# Patient Record
Sex: MALE | Race: WHITE
[De-identification: names, ages, dates, MRNs, and addresses within clinical notes are randomized per-mention and may not be internally consistent; named-entity substitution may affect disease eponyms.]

---

## 2020-10-06 ENCOUNTER — HOSPITAL ENCOUNTER (EMERGENCY)
Dept: HOSPITAL 88 - ER | Age: 55
Discharge: HOME | End: 2020-10-06
Payer: COMMERCIAL

## 2020-10-06 VITALS — HEIGHT: 72 IN | WEIGHT: 180 LBS | BODY MASS INDEX: 24.38 KG/M2

## 2020-10-06 VITALS — SYSTOLIC BLOOD PRESSURE: 121 MMHG | DIASTOLIC BLOOD PRESSURE: 93 MMHG

## 2020-10-06 DIAGNOSIS — F17.210: ICD-10-CM

## 2020-10-06 DIAGNOSIS — R51.9: Primary | ICD-10-CM

## 2020-10-06 LAB
ALBUMIN SERPL-MCNC: 4.3 G/DL (ref 3.5–5)
ALBUMIN/GLOB SERPL: 1.4 {RATIO} (ref 0.8–2)
ALP SERPL-CCNC: 78 IU/L (ref 40–150)
ALT SERPL-CCNC: 17 IU/L (ref 0–55)
ANION GAP SERPL CALC-SCNC: 13.8 MMOL/L (ref 8–16)
BASOPHILS # BLD AUTO: 0.1 10*3/UL (ref 0–0.1)
BASOPHILS NFR BLD AUTO: 0.7 % (ref 0–1)
BUN SERPL-MCNC: 12 MG/DL (ref 7–26)
BUN/CREAT SERPL: 10 (ref 6–25)
CALCIUM SERPL-MCNC: 9.6 MG/DL (ref 8.4–10.2)
CHLORIDE SERPL-SCNC: 103 MMOL/L (ref 98–107)
CK MB SERPL-MCNC: 2.4 NG/ML (ref 0–5)
CK SERPL-CCNC: 162 IU/L (ref 30–200)
CO2 SERPL-SCNC: 27 MMOL/L (ref 22–29)
DEPRECATED NEUTROPHILS # BLD AUTO: 8.2 10*3/UL (ref 2.1–6.9)
EGFRCR SERPLBLD CKD-EPI 2021: 60 ML/MIN (ref 60–?)
EOSINOPHIL # BLD AUTO: 0.3 10*3/UL (ref 0–0.4)
EOSINOPHIL NFR BLD AUTO: 2.4 % (ref 0–6)
ERYTHROCYTE [DISTWIDTH] IN CORD BLOOD: 12.9 % (ref 11.7–14.4)
GLOBULIN PLAS-MCNC: 3 G/DL (ref 2.3–3.5)
GLUCOSE SERPLBLD-MCNC: 90 MG/DL (ref 74–118)
HCT VFR BLD AUTO: 46.2 % (ref 38.2–49.6)
HGB BLD-MCNC: 15.5 G/DL (ref 14–18)
LYMPHOCYTES # BLD: 2 10*3/UL (ref 1–3.2)
LYMPHOCYTES NFR BLD AUTO: 17.2 % (ref 18–39.1)
MCH RBC QN AUTO: 31.6 PG (ref 28–32)
MCHC RBC AUTO-ENTMCNC: 33.5 G/DL (ref 31–35)
MCV RBC AUTO: 94.3 FL (ref 81–99)
MONOCYTES # BLD AUTO: 0.8 10*3/UL (ref 0.2–0.8)
MONOCYTES NFR BLD AUTO: 7.3 % (ref 4.4–11.3)
NEUTS SEG NFR BLD AUTO: 71.9 % (ref 38.7–80)
PLATELET # BLD AUTO: 197 X10E3/UL (ref 140–360)
POTASSIUM SERPL-SCNC: 3.8 MMOL/L (ref 3.5–5.1)
RBC # BLD AUTO: 4.9 X10E6/UL (ref 4.3–5.7)
SODIUM SERPL-SCNC: 140 MMOL/L (ref 136–145)

## 2020-10-06 PROCEDURE — 99284 EMERGENCY DEPT VISIT MOD MDM: CPT

## 2020-10-06 PROCEDURE — 85025 COMPLETE CBC W/AUTO DIFF WBC: CPT

## 2020-10-06 PROCEDURE — 84484 ASSAY OF TROPONIN QUANT: CPT

## 2020-10-06 PROCEDURE — 82550 ASSAY OF CK (CPK): CPT

## 2020-10-06 PROCEDURE — 82553 CREATINE MB FRACTION: CPT

## 2020-10-06 PROCEDURE — 36415 COLL VENOUS BLD VENIPUNCTURE: CPT

## 2020-10-06 PROCEDURE — 93005 ELECTROCARDIOGRAM TRACING: CPT

## 2020-10-06 PROCEDURE — 80053 COMPREHEN METABOLIC PANEL: CPT

## 2020-10-06 PROCEDURE — 70450 CT HEAD/BRAIN W/O DYE: CPT

## 2020-10-06 NOTE — EMERGENCY DEPARTMENT NOTE
History of Present Illnes


History of Present Illness


History of Present Illness


This is a 55 year old  male presents to the ED for HA R temple 20 min 

pta which has eased up since arrival to ED. Complained of self limited R 

perioral numbness . .


Historian:  Patient


Arrival Mode:  Car


Onset (how long ago):  minute(s) (20)


Location:  R temple


Radiation:  Reports non-radiation


Severity:  mild


Onset quality:  sudden


Timing of current episode:  constant


Progression:  partially resolved


Chronicity:  new


Relieving factors:  none


Exacerbating factors:  none


Associated symptoms:  Denies denies other symptoms, Denies confusion, Denies 

chest pain, Denies cough, Denies diaphoresis, Denies fever/chills, Denies 

headaches, Denies loss of appetite, Denies malaise, Denies nausea/vomiting, 

Denies rash, Denies seizure, Denies shortness of breath, Denies syncope, Denies 

weakness, Denies other


Treatments prior to arrival:  none


 (VANDA ZENG DO)





Past Medical/Family History


Physician Review


I have reviewed the patient's past medical and family history.  Any updates have

been documented here.


 (VANDA ZENG DO)





Past Medical History


Recent Fever:  No


Clinical Suspicion of Infectio:  No


Past Medical History:  None


Past Surgical History:  T&A


 (VANDA ZENG DO)





Social History


Smoking Cessation:  Current some day smoker


Counseling Performed:  Yes


Alcohol Use:  None


Any Illegal Drug Use:  No


 (VANDA ZENG DO)





Other


Last Tetanus:  UNKNOWN


 (VANDA ZENG DO)





Review of Systems


Review of Systems


Constitutional:  Reports no symptoms


EENTM:  Reports no symptoms


Cardiovascular:  Reports no symptoms


Respiratory:  Reports no symptoms


Gastrointestinal:  Reports no symptoms


Genitourinary:  Reports no symptoms


Musculoskeletal:  Reports no symptoms


Integumentary:  Reports no symptoms


Neurological:  Reports headache


Psychological:  Reports no symptoms


Endocrine:  Reports no symptoms


Hematological/Lymphatic:  Reports no symptoms (VANDA ZENG DO)





Physical Exam


Related Data


Allergies:  


Coded Allergies:  


     No Known Allergies (Unverified , 3/29/18)


Vital signs reviewed:  Yes


 (VANDA ZENG DO)





Physical Exam


CONSTITUTIONAL





Constitutional:  Present well-developed, Present well-nourished


HENT


HENT:  Present normocephalic, Present atraumatic, Present oropharynx 

clear/moist, Present nose normal


HENT L/R:  Present left ext ear normal, Present right ext ear normal


EYES





Eyes:  Reports PERRL, Reports conjunctivae normal


NECK


Neck:  Present ROM normal


PULMONARY


Pulmonary:  Present effort normal, Present breath sounds normal


CARDIOVASCULAR





Cardiovascular:  Present regular rhythm, Present heart sounds normal, Present 

capillary refill normal, Present normal rate


GASTROINTESTINAL





Abdominal:  Present soft, Present nontender, Present bowel sounds normal


GENITOURINARY





Genitourinary:  Present exam deferred


SKIN


Skin:  Present warm, Present dry


MUSCULOSKELETAL





Musculoskeletal:  Present ROM normal


NEUROLOGICAL





Neurological:  Present alert, Present oriented x 3, Present no gross motor or 

sensory deficits


PSYCHOLOGICAL


Psychological:  Present mood/affect normal, Present judgement normal 

(VANDA ZENG DO)





Assessment & Plan


Medical Decision Making


MDM


diff dx : SAH, migraine, bells palsy, TIA, CVA


 (VANDA ZENG DO)


MDM


Ct head benign, labs benign. Symptoms are largely resolved. Doubt TIA or CVA. 

Doubt emergent process at this time. I discussed results patient as well as 

expected disease time course and management. They will follow up with their 

primary care provider or return to the emergency department for new or worsening

symptoms. Patient's appropriate for discharge.





Part of this note was dictated with Johanna and is subject to recognition errors.


 (DARNELL ROBBINS MD)





Reassessment


Reassessment time:  06:41


Reassessment


Well appearing, NAD


 (DARNELL ROBBINS MD)





Assessment & Plan


Final Impression:  


(1) Headache (DARNELL ROBBINS MD)


Depart Disposition:  HOME, SELF-CARE











VANDA ZENG DO                 Oct 6, 2020 05:38


DARNELL ROBBINS MD           Oct 6, 2020 06:49

## 2020-10-06 NOTE — XMS REPORT
Continuity of Care Document

                             Created on: 10/06/2020



EMY BANG

External Reference #: [id number]

: 1965

Sex: Male



Demographics





                          Address                   [address] [place] [address]  [address]

 

                          Home Phone                [phone]

 

                          Preferred Language        Unknown

 

                          Marital Status            Unknown

 

                          Mor[employer] Affiliation     Unknown

 

                          Race                      Unknown

 

                          Additional Race(s)        White



 

                          Ethnic Group              Unknown





Author





                          Author                    [facility]

t

 

                          Organization              [facility]

 

                          Address                   [address] [place] [address] [address]

[place]  [address]



 

                          Phone                     Unavailable







Care Team Providers





                    Care Team Member Name Role                Phone

 

                    SERENA MUÑOZ MD   PCP                 [phone]

 

                    WEST KERNS        Attphys             Unavailable

 

                    ALEXANDRIA HENRY   Attlottie             Unavailable







Payers





           Payer Name Policy Type Policy Number Effective Date Expiration Date S

sofya

 

           [facility]            [id number] 2009 00:00:00     

       [facility]







Problems

This patient has no known problems.



Allergies, Adverse Reactions, Alerts

This patient has no known allergies or adverse reactions.



Medications

This patient has no known medications.



Procedures





                Procedure       Date / Time Performed Performing Clinician [facility]

e

 

                    Dup-scan artl gloria abdl/pel/scrot&/RPR orgn lmt 2018 [facility]

 

                [facility]







Encounters





             Start Date/Time End Date/Time Encounter Type Admission Type Attendi

[facility]   Care Department Encounter ID    Source

 

             2018 14:20:00 2018 20:13:00 Departed Emergency Room ER 

          ALEXANDRIA HENRY         [facility]          [id number]    [facility]







Results





           Test Description Test Time  Test Comments Results    Result Comments 

Source

 

                CT ABDOMEN/PELVIS WOW 2020-03-10 16:28:00                       

                              

                                                 [facility]                        [address], [place] [address]      Patient Name: EMY BANG                                   MR 
#: [id number]                     : 1965                               
   Age/Sex: 54/M  Acct #: [id number]                              Req #: [id number]
[id number]  Adm Physician:                                                      
Ordered by: WEST KERNS MD                            Report #: [id number]       
Location: CT                                      Room/Bed:                     
________________________________________________________________________________

___________________    Procedure: [id number] CT/CT ABDOMEN/PELVIS WOW  Exam Date:
03/10/20                            Exam Time: 1535                             
                REPORT STATUS: Signed    CT of the abdomen and pelvis, without 
and with contrast.          History: Microscopic hematuria.      Comparison: 
None available.      Technique: Multidetector CT scanning of the abdomen and 
pelvis was performed   from the level of the lung bases to the inferior pubic 
rami before and after   intravenous administration of contrast according to the 
CT or protocol.    Coronal, sagittal, and three-dimensional reformations were 
obtained.      RADIATION DOSE:        Total DLP: 1146.14 mGy*cm        Dose 
modulation, iterative reconstruction, and/or weight based adjustment   of the 
mA/kV was utilized to reduce the radiation dose to as low as reasonably   
achievable.       FINDINGS:   The visualized lung bases demonstrate no 
significant abnormalities. The imaged   portion of the heart is unremarkable.   
  The liver is normal in size and attenuation. A subcentimeter hypodensity is   
identified within the right hepatic lobe which is too small to definitively   
characterize. No other focal hepatic abnormality is identified. The gallbladder 
 is unremarkable. There is no biliary ductal dilatation. There is a small hiatal
  hernia present. The stomach is otherwise unremarkable. The spleen, pancreas,  
and bilateral adrenal glands are unremarkable.      The kidneys are normal in 
size and location and symmetrically enhance/excrete   contrast material. There 
is no evidence for nephrolithiasis. There is a   exophytic cyst identified 
arising off the inferior pole of the left kidney   measuring up to 1.4 cm. There
are additional subcentimeter hypodensities   identified bilaterally which are to
small to definitively characterize but   likely represent cysts. There is no 
evidence for solid/enhancing mass. There is   a single collecting system and 
ureter identified bilaterally. There is no   evidence of a filling defect or 
other abnormality. The urinary bladder is   unremarkable. The prostate 
demonstrates no significant abnormalities.      The abdominal aorta is normal 
course and caliber with mild atherosclerotic   calcifications. The IVC is 
unremarkable.      The visualized loops of small large bowel demonstrate no 
evidence of   obstruction or inflammation. There is no ascites or 
intraperitoneal free air.   No abnormally enlarged lymph nodes are identified 
within the abdomen or pelvis.   There is a small fat-containing umbilical hernia
present. Small bilateral   fat-containing inguinal hernias noted.      The 
osseous structures demonstrate no evidence for acute fracture or   destructive 
process. The extraperitoneal soft tissues are unremarkable.         IMPRESSION: 
    Unremarkable CT urogram without evidence for nephrolithiasis, 
hydronephrosis,   solid renal mass, or other ureteral filling 
defect/abnormality.      Small hiatal hernia.      Signed by: Dr. Darren Mcpherson MD on 3/10/2020 5:10 PM        Dictated By: DARREN MCPHERSON MD  Electronically 
Signed By: DARREN MCPHERSON MD on 03/10/20 1710  Transcribed By: JIMY on 03/10/20
1710       COPY TO:   WEST KERNS MD                                     

 

                 TESTICULAR   2020 10:43:00                             

                                

                                         [place] [address]      
Patient Name: EMY BANG                                   MR #: [id number]     
               : 1965                                   Age/Sex: 54/M  
Acct #: [id number]                              Req #: [id number]  Adm 
Physician:                                                      Ordered by: 
WEST KERNS MD                            Report #: [id number]        Location: 
                                      Room/Bed:                     
________________________________________________________________________________

___________________    Procedure: [id number] US/US TESTICULAR  Exam Date: 
20                            Exam Time: 1019                             
                REPORT STATUS: Signed       Testicular ultrasound, with Doppler 
examination.         History: Spermatocele.      Comparison: None available.    
 Discussion: Evaluation of the scrotum was performed in the transverse and   
longitudinal planes. Color Doppler and spectral wave form analysis was   pe
rformed bilaterally.            The testes are normal in size and echogenicity 
bilaterally, measuring 5.3 x 2.9   x 3.8 cm on the right and 5.1 x 2.9 x 3.9 cm 
on the left. There is no evidence   of a mass.  Normal and symmetrical flow is 
present within both testes. There is   no evidence of hydrocele.       Multiple 
oval circumscribed nearly anechoic structures are present within both   
epididymides. There are 2 on the right measuring 2.0 x 0.9 x 1.4 cm and 1.5 x   
1.1 x 1.0 cm. There are at least 3 on the left, the largest measuring 5.2 x 2.5 
 x 3.7 cm and 2 others measuring 2.1 and 1.7 cm in maximal diameter.      
IMPRESSION:       1. Normal bilateral testes.   2. Multiple bilateral 
spermatoceles versus epididymal head cysts.      Signed by: Vanda Kelsey on 
2020 10:52 AM        Dictated By: VANDA KELSEY MD  Electronically Signed 
By: VANDA KELSEY MD on 20 1231  Transcribed By: JIMY on 20 1231 
     COPY TO:   WEST KERNS MD                                     

 

                US TESTICULAR DOPPLER LTD 2020 10:43:00                   

                              

                                                     [place] [address]      Patient Name: EMY BANG                                   MR 
#: [id number]                     : 1965                               
   Age/Sex: 54/M  Acct #: [id number]                              Req #: [id number]
[id number]  Adm Physician:                                                      
Ordered by: WEST KERNS MD                            Report #: [id number]       
Location:                                       Room/Bed:                     
________________________________________________________________________________

___________________    Procedure: [id number] US/US TESTICULAR DOPPLER LTD  Exam 
Date: 20                            Exam Time: 1019                       
                      REPORT STATUS: Signed       Testicular ultrasound, with 
Doppler examination.         History: Spermatocele.      Comparison: None 
available.      Discussion: Evaluation of the scrotum was performed in the 
transverse and   longitudinal planes. Color Doppler and spectral wave form von
sis was   performed bilaterally.            The testes are normal in size and 
echogenicity bilaterally, measuring 5.3 x 2.9   x 3.8 cm on the right and 5.1 x 
2.9 x 3.9 cm on the left. There is no evidence   of a mass.  Normal and 
symmetrical flow is present within both testes. There is   no evidence of 
hydrocele.       Multiple oval circumscribed nearly anechoic structures are 
present within both   epididymides. There are 2 on the right measuring 2.0 x 0.9
x 1.4 cm and 1.5 x   1.1 x 1.0 cm. There are at least 3 on the left, the largest
measuring 5.2 x 2.5   x 3.7 cm and 2 others measuring 2.1 and 1.7 cm in maximal 
diameter.      IMPRESSION:       1. Normal bilateral testes.   2. Multiple 
bilateral spermatoceles versus epididymal head cysts.      Signed by: Vanda Kelsey on 2020 10:52 AM        Dictated By: VANDA KELSEY MD  Electronically
Signed By: VANDA KELSEY MD on 20 1231  Transcribed By: JIMY on 
20 1231       COPY TO:   WEST KERNS MD                                    

 

 

                    Urine WBC           2018 16:10:00   

 

                                        Test Item

 

             Urine WBC (test code = 5821-4) NONE         0-5                    

    





[facility] [id number] [facility]10[facility]* 



             Test Item    Value        Reference Range Interpretation Comments

 

             Urine RBC (test code = 71577-3) 0-5          0-5                   

     





[facility] [id number] 16:10:00* 



             Test Item    Value        Reference Range Interpretation Comments

 

             Urine Bacteria (test code = 61572-6) FEW          NONE             

          





[facility] Epithelial [id number] 16:10:00
  * 



             Test Item    Value        Reference Range Interpretation Comments

 

             Urine Epithelial Cells (test code = 40157-9) RARE         NONE     

                  





[facility] [id number] 16:08:00* 



             Test Item    Value        Reference Range Interpretation Comments

 

             Urine Color (test code = 5778-6) YELLOW       YELLOW               

      





[facility] [id number] 16:08:00* 



             Test Item    Value        Reference Range Interpretation Comments

 

             Urine Clarity (test code = 61009-6) CLEAR        CLEAR             

         





[facility] Specific [id number] 16:08:00
  * 



             Test Item    Value        Reference Range Interpretation Comments

 

             Urine Specific Gravity (test code = 5811-5) 1.015        1.010-1.02

5                





[facility] [id number] 16:08:00* 



             Test Item    Value        Reference Range Interpretation Comments

 

             Urine pH (test code = 63408-7) 6.5          5-7                    

    





[facility] Leukocyte [id number] 
16:08:00* 



             Test Item    Value        Reference Range Interpretation Comments

 

             Urine Leukocyte Esterase (test code = 5799-2) NEGATIVE     NEGATIVE

                   





[facility] Bdlpfnp0871-09-63 16:08:00* 



             Test Item    Value        Reference Range Interpretation Comments

 

             Urine Nitrite (test code = 18624-1) NEGATIVE     NEGATIVE          

         





[facility] [id number] 16:08:00* 



             Test Item    Value        Reference Range Interpretation Comments

 

             Urine Protein (test code = 5804-0) NEGATIVE     NEGATIVE           

        





[facility] Glucose (UA)2018 16:08:00* 



             Test Item    Value        Reference Range Interpretation Comments

 

             Urine Glucose (UA) (test code = 2349-9) NEGATIVE     NEGATIVE      

             





[facility] Hykzaam5159-04-70 16:08:00* 



             Test Item    Value        Reference Range Interpretation Comments

 

             Urine Ketones (test code = 13598-0) NEGATIVE     NEGATIVE          

         





[facility] Hcyskarbdmuu2962-54-17 16:08:00* 



             Test Item    Value        Reference Range Interpretation Comments

 

             Urine Urobilinogen (test code = 63261-2) 0.2          0.2-1        

              





[facility] Jbwfuyers7164-85-17 16:08:00* 



             Test Item    Value        Reference Range Interpretation Comments

 

             Urine Bilirubin (test code = 1978-6) NEGATIVE     NEGATIVE         

          





[facility] Ygtwo9741-20-09 16:08:00* 



             Test Item    Value        Reference Range Interpretation Comments

 

             Urine Blood (test code = 26432-6) 1+           NEGATIVE     H      

       





[facility]    [place] [address]      
Patient Name: EMY BANG   MR #: [id number]    : 1965 Age/Sex: 52/M  
Acct #: [id number] Req #: [id number]  Adm Physician:     Ordered by: VANDA MELO NP  Report #: [id number]   Location: ER  Room/Bed:     
_____________________________________________________________________________
______________________    Procedure: [id number] US/US TESTICULAR  Exam Date:     
                       Exam Time:        REPORT STATUS: Signed    PROCEDURE:   
TESTICULAR ULTRASOUND and DUPLEX ULTRASOUND   COMPARISON:   None.   INDICATIONS:
  LEFT UPPER TESTICULAR MASS   TECHNIQUE: Gray-scale and color doppler images of
the testicles and    scrotal contents were obtained. Duplex imaging with spect
ral waveform    analysis was performed of the testicular arteries and veins.    
   FINDINGS:       RIGHT SCROTUM:   Testicle: 4.3 x 2.9 x 4.2 cm.   Epididymal 
head: 0.9 x 1.5 x 1.8 cm. 1.1 x 0.5 x 0.9 cm and 0.9 x 0.7 x    0.8 cm right epi
didymal head cyst.   Hydrocele: None.   Varicocele: None.       LEFT SCROTUM:   
Testicle: 3.4 x 3.6 x 4.4 cm.   Epididymal head: 2.9 x 2.4 x 4.2 cm. 2.5 x 2.3 x
2.7 cm left epididymal    head cysts/spermatocele, previously 2.0 x 1.5 x 2.6 c
m. 2.1 x 1.8 x 2.1    cm left epididymal head cyst or spermatocele. 1.3 x 0.8 x 
1.6 cm left    epididymal body cyst/spermatocele.   Hydrocele: None.   Varicocel
e: None.             CONCLUSION:      Bilateral epididymal cysts and spermatocel
es. Otherwise unremarkable    ultrasound.       Dictated by:  Liz Brock M.D. on 3
/ at 16:58        Electronically approved by:  Liz Brock M.D. on 3/29/2018
at 16:58                Dictated By: LIZ BROCK MD  Electronically Signed By: LIZ BROCK MD on 18  Transcribed By: BOOGIE on 18       COPY TO:  
VANDA MELO NP         TESTICULAR DOPPLER LTD    [place] [address]      
Patient Name: EMY BANG   MR #: [id number]    : 1965 Age/Sex: 52/M  
Acct #: [id number] Req #: [id number]  Adm Physician:     Ordered by: VANDA MELO NP  Report #: [id number]   Location: ER  Room/Bed:     
_____________________________________________________________________________
______________________    Procedure: [id number] US/US TESTICULAR DOPPLER LTD  Exa
m Date:                             Exam Time:        REPORT STATUS: Signed    P
ROCEDURE:   TESTICULAR DOPPLER ULTRASOUND   COMPARISON:   None.   INDICATIONS:  
LEFT UPPER TESTICULAR MASS   TECHNIQUE: Gray-scale and color doppler images of 
the testicles and    scrotal contents were obtained. Duplex imaging with spectra
l waveform    analysis was performed of the testicular arteries and veins.      
 FINDINGS:       Please see testicular ultrasound from the same day for combined
   dictation.          CONCLUSION:      Please see testicular ultrasound from 
the same day for combined    dictation.          Dictated by:  Liz Brock M.D. on 
3/29/2018 at 16:59        Electronically approved by:  Liz Brock M.D. on 3/29/201
8 at 16:59                Dictated By: LIZ BROCK MD  Electronically Signed By: LIZ BROCK MD on 18  Transcribed By: BOOGIE on 18       COPY TO:  
VANDA MELO NP

## 2020-10-06 NOTE — DIAGNOSTIC IMAGING REPORT
Examination: CT BRAIN WO CONTRAST



History:Temporal headaches and dizziness.

Comparison studies:None



Technique:

Axial images were obtained from the skull base to the vertex.

Coronal and sagittal images reconstructed from the axial data.

Dose modulation, iterative reconstruction, and/or weight based adjustment of

the mA/kV was utilized to reduce the radiation dose to as low as reasonably

achievable. 

Intravenous contrast: None



Findings:



Scalp: No abnormalities.

Bones: No fractures, blastic or lytic lesions.



Brain sulci: Appropriate for age.

Ventricles: Normal in size and configuration. No hydrocephalus.



Extra-axial space:

No abnormalities.



Parenchyma: 

No abnormal densities. 

No masses, hemorrhage, or acute or chronic cortical based vascular insults..



Sellar/suprasellar region: No abnormalities.

Craniocervical junction: Patent foramen magnum. No Chiari one malformation.



Incidental findings: 

None.



Impression:

 

No intracranial abnormalities.



Signed by: Dr. Belinda Painter M.D. on 10/6/2020 6:33 AM